# Patient Record
Sex: MALE | Race: BLACK OR AFRICAN AMERICAN | Employment: FULL TIME | URBAN - METROPOLITAN AREA
[De-identification: names, ages, dates, MRNs, and addresses within clinical notes are randomized per-mention and may not be internally consistent; named-entity substitution may affect disease eponyms.]

---

## 2018-01-25 ENCOUNTER — APPOINTMENT (OUTPATIENT)
Dept: CT IMAGING | Age: 43
End: 2018-01-25
Attending: EMERGENCY MEDICINE
Payer: MEDICAID

## 2018-01-25 ENCOUNTER — HOSPITAL ENCOUNTER (EMERGENCY)
Age: 43
Discharge: HOME | End: 2018-01-25
Attending: EMERGENCY MEDICINE
Payer: MEDICAID

## 2018-01-25 VITALS
OXYGEN SATURATION: 97 % | HEIGHT: 75 IN | HEART RATE: 66 BPM | TEMPERATURE: 98.3 F | DIASTOLIC BLOOD PRESSURE: 90 MMHG | RESPIRATION RATE: 16 BRPM | SYSTOLIC BLOOD PRESSURE: 220 MMHG | BODY MASS INDEX: 35.31 KG/M2 | WEIGHT: 283.95 LBS

## 2018-01-25 DIAGNOSIS — J01.80 OTHER ACUTE SINUSITIS, RECURRENCE NOT SPECIFIED: ICD-10-CM

## 2018-01-25 DIAGNOSIS — R22.0 NASAL SWELLING: ICD-10-CM

## 2018-01-25 DIAGNOSIS — R51.9 NONINTRACTABLE HEADACHE, UNSPECIFIED CHRONICITY PATTERN, UNSPECIFIED HEADACHE TYPE: Primary | ICD-10-CM

## 2018-01-25 DIAGNOSIS — R09.81 SINUS CONGESTION: ICD-10-CM

## 2018-01-25 LAB
ALBUMIN SERPL-MCNC: 3.7 G/DL (ref 3.5–5)
ALBUMIN/GLOB SERPL: 0.9 {RATIO} (ref 1.1–2.2)
ALP SERPL-CCNC: 63 U/L (ref 45–117)
ALT SERPL-CCNC: 27 U/L (ref 12–78)
ANION GAP SERPL CALC-SCNC: 4 MMOL/L (ref 5–15)
AST SERPL-CCNC: 15 U/L (ref 15–37)
BASOPHILS # BLD: 0 K/UL (ref 0–0.1)
BASOPHILS NFR BLD: 0 % (ref 0–1)
BILIRUB SERPL-MCNC: 0.7 MG/DL (ref 0.2–1)
BUN SERPL-MCNC: 17 MG/DL (ref 6–20)
BUN/CREAT SERPL: 12 (ref 12–20)
CALCIUM SERPL-MCNC: 9 MG/DL (ref 8.5–10.1)
CHLORIDE SERPL-SCNC: 103 MMOL/L (ref 97–108)
CO2 SERPL-SCNC: 33 MMOL/L (ref 21–32)
CREAT SERPL-MCNC: 1.39 MG/DL (ref 0.7–1.3)
EOSINOPHIL # BLD: 0.2 K/UL (ref 0–0.4)
EOSINOPHIL NFR BLD: 3 % (ref 0–7)
ERYTHROCYTE [DISTWIDTH] IN BLOOD BY AUTOMATED COUNT: 13.2 % (ref 11.5–14.5)
FLUAV AG NPH QL IA: NEGATIVE
FLUBV AG NOSE QL IA: NEGATIVE
GLOBULIN SER CALC-MCNC: 3.9 G/DL (ref 2–4)
GLUCOSE SERPL-MCNC: 110 MG/DL (ref 65–100)
HCT VFR BLD AUTO: 39.5 % (ref 36.6–50.3)
HGB BLD-MCNC: 13.4 G/DL (ref 12.1–17)
LYMPHOCYTES # BLD: 1.4 K/UL (ref 0.8–3.5)
LYMPHOCYTES NFR BLD: 18 % (ref 12–49)
MCH RBC QN AUTO: 28.5 PG (ref 26–34)
MCHC RBC AUTO-ENTMCNC: 33.9 G/DL (ref 30–36.5)
MCV RBC AUTO: 83.9 FL (ref 80–99)
MONOCYTES # BLD: 0.6 K/UL (ref 0–1)
MONOCYTES NFR BLD: 8 % (ref 5–13)
NEUTS SEG # BLD: 5.7 K/UL (ref 1.8–8)
NEUTS SEG NFR BLD: 71 % (ref 32–75)
PLATELET # BLD AUTO: 183 K/UL (ref 150–400)
POTASSIUM SERPL-SCNC: 3.7 MMOL/L (ref 3.5–5.1)
PROT SERPL-MCNC: 7.6 G/DL (ref 6.4–8.2)
RBC # BLD AUTO: 4.71 M/UL (ref 4.1–5.7)
SODIUM SERPL-SCNC: 140 MMOL/L (ref 136–145)
WBC # BLD AUTO: 7.9 K/UL (ref 4.1–11.1)

## 2018-01-25 PROCEDURE — 96374 THER/PROPH/DIAG INJ IV PUSH: CPT

## 2018-01-25 PROCEDURE — 99283 EMERGENCY DEPT VISIT LOW MDM: CPT

## 2018-01-25 PROCEDURE — 74011250637 HC RX REV CODE- 250/637: Performed by: EMERGENCY MEDICINE

## 2018-01-25 PROCEDURE — 74011250636 HC RX REV CODE- 250/636: Performed by: EMERGENCY MEDICINE

## 2018-01-25 PROCEDURE — 85025 COMPLETE CBC W/AUTO DIFF WBC: CPT | Performed by: EMERGENCY MEDICINE

## 2018-01-25 PROCEDURE — 87804 INFLUENZA ASSAY W/OPTIC: CPT | Performed by: EMERGENCY MEDICINE

## 2018-01-25 PROCEDURE — 96375 TX/PRO/DX INJ NEW DRUG ADDON: CPT

## 2018-01-25 PROCEDURE — 96361 HYDRATE IV INFUSION ADD-ON: CPT

## 2018-01-25 PROCEDURE — 80053 COMPREHEN METABOLIC PANEL: CPT | Performed by: EMERGENCY MEDICINE

## 2018-01-25 PROCEDURE — 70486 CT MAXILLOFACIAL W/O DYE: CPT

## 2018-01-25 PROCEDURE — 36415 COLL VENOUS BLD VENIPUNCTURE: CPT | Performed by: EMERGENCY MEDICINE

## 2018-01-25 RX ORDER — PROCHLORPERAZINE EDISYLATE 5 MG/ML
10 INJECTION INTRAMUSCULAR; INTRAVENOUS
Status: COMPLETED | OUTPATIENT
Start: 2018-01-25 | End: 2018-01-25

## 2018-01-25 RX ORDER — MORPHINE SULFATE 2 MG/ML
4 INJECTION, SOLUTION INTRAMUSCULAR; INTRAVENOUS
Status: DISCONTINUED | OUTPATIENT
Start: 2018-01-25 | End: 2018-01-25

## 2018-01-25 RX ORDER — TRAMADOL HYDROCHLORIDE 50 MG/1
50 TABLET ORAL
Qty: 12 TAB | Refills: 0 | Status: SHIPPED | OUTPATIENT
Start: 2018-01-25 | End: 2018-02-04

## 2018-01-25 RX ORDER — AMOXICILLIN AND CLAVULANATE POTASSIUM 875; 125 MG/1; MG/1
1 TABLET, FILM COATED ORAL 2 TIMES DAILY
Qty: 20 TAB | Refills: 0 | Status: SHIPPED | OUTPATIENT
Start: 2018-01-25

## 2018-01-25 RX ORDER — KETOROLAC TROMETHAMINE 30 MG/ML
15 INJECTION, SOLUTION INTRAMUSCULAR; INTRAVENOUS
Status: COMPLETED | OUTPATIENT
Start: 2018-01-25 | End: 2018-01-25

## 2018-01-25 RX ORDER — AMOXICILLIN AND CLAVULANATE POTASSIUM 875; 125 MG/1; MG/1
1 TABLET, FILM COATED ORAL
Status: COMPLETED | OUTPATIENT
Start: 2018-01-25 | End: 2018-01-25

## 2018-01-25 RX ORDER — BUTALBITAL, ACETAMINOPHEN AND CAFFEINE 300; 40; 50 MG/1; MG/1; MG/1
1 CAPSULE ORAL
Qty: 12 CAP | Refills: 0 | Status: SHIPPED | OUTPATIENT
Start: 2018-01-25

## 2018-01-25 RX ORDER — SODIUM CHLORIDE 9 MG/ML
1000 INJECTION, SOLUTION INTRAVENOUS CONTINUOUS
Status: DISCONTINUED | OUTPATIENT
Start: 2018-01-25 | End: 2018-01-25

## 2018-01-25 RX ADMIN — AMOXICILLIN AND CLAVULANATE POTASSIUM 1 TABLET: 875; 125 TABLET, FILM COATED ORAL at 06:23

## 2018-01-25 RX ADMIN — KETOROLAC TROMETHAMINE 15 MG: 30 INJECTION, SOLUTION INTRAMUSCULAR at 05:27

## 2018-01-25 RX ADMIN — SODIUM CHLORIDE 1000 ML: 900 INJECTION, SOLUTION INTRAVENOUS at 05:28

## 2018-01-25 RX ADMIN — PROCHLORPERAZINE EDISYLATE 10 MG: 5 INJECTION INTRAMUSCULAR; INTRAVENOUS at 05:27

## 2018-01-25 NOTE — ED TRIAGE NOTES
Chief Complaint: sinus pressure and headache   Patient states sinus pressure and swelling per patient started at 1000 yesterday now headache since 1300  Onset 1000    Pt arrived via private car.

## 2018-01-25 NOTE — DISCHARGE INSTRUCTIONS
Head or Face Pain: Care Instructions  Your Care Instructions    Common causes of head or face pain are allergies, stress, and injuries. Other causes include tooth problems and sinus infections. Eating certain foods, such as chocolate or cheese, or drinking certain liquids, such as coffee or cola, can cause head pain for some people. If you have mild head pain, you may not need treatment. It is important to watch your symptoms and talk to your doctor if your pain continues or gets worse. Follow-up care is a key part of your treatment and safety. Be sure to make and go to all appointments, and call your doctor if you are having problems. It's also a good idea to know your test results and keep a list of the medicines you take. How can you care for yourself at home? · Take pain medicines exactly as directed. ¨ If the doctor gave you a prescription medicine for pain, take it as prescribed. ¨ If you are not taking a prescription pain medicine, ask your doctor if you can take an over-the-counter pain medicine. · Take it easy for the next few days or longer if you are not feeling well. · Use a warm, moist towel or heating pad set on low to relax tight muscles in your shoulder and neck. Have someone gently massage your neck and shoulders. · Put ice or a cold pack on the area for 10 to 20 minutes at a time. Put a thin cloth between the ice and your skin. When should you call for help? Call 911 anytime you think you may need emergency care. For example, call if:  ? · You have twitching, jerking, or a seizure. ? · You passed out (lost consciousness). ? · You have symptoms of a stroke. These may include:  ¨ Sudden numbness, tingling, weakness, or loss of movement in your face, arm, or leg, especially on only one side of your body. ¨ Sudden vision changes. ¨ Sudden trouble speaking. ¨ Sudden confusion or trouble understanding simple statements. ¨ Sudden problems with walking or balance.   ¨ A sudden, severe headache that is different from past headaches. ? · You have jaw pain and pain in your chest, shoulder, neck, or arm. ?Call your doctor now or seek immediate medical care if:  ? · You have a fever with a stiff neck or a severe headache. ? · You have nausea and vomiting, or you cannot keep food or liquids down. ? Watch closely for changes in your health, and be sure to contact your doctor if:  ? · Your head or face pain does not get better as expected. Where can you learn more? Go to http://eulalia-lindsay.info/. Enter P568 in the search box to learn more about \"Head or Face Pain: Care Instructions. \"  Current as of: March 20, 2017  Content Version: 11.4  © 9784-3114 [employer]. Care instructions adapted under license by [employer] (which disclaims liability or warranty for this information). If you have questions about a medical condition or this instruction, always ask your healthcare professional. [place] [address] any warranty or liability for your use of this information. Saline Nasal Washes: Care Instructions  Your Care Instructions  Saline nasal washes help keep the nasal passages open by washing out thick or dried mucus. This simple remedy can help relieve symptoms of allergies, sinusitis, and colds. It also can make the nose feel more comfortable by keeping the mucous membranes moist. You may notice a little burning sensation in your nose the first few times you use the solution, but this usually gets better in a few days. Follow-up care is a key part of your treatment and safety. Be sure to make and go to all appointments, and call your doctor if you are having problems. It's also a good idea to know your test results and keep a list of the medicines you take. How can you care for yourself at home? · You can buy premixed saline solution in a squeeze bottle or other sinus rinse products at a drugstore.  Read and follow the instructions on the label. · You also can make your own saline solution by adding 1 teaspoon of salt and 1 teaspoon of baking soda to 2 cups of distilled water. · If you use a homemade solution, pour a small amount into a clean bowl. Using a rubber bulb syringe, squeeze the syringe and place the tip in the salt water. Pull a small amount of the salt water into the syringe by relaxing your hand. · Sit down with your head tilted slightly back. Do not lie down. Put the tip of the bulb syringe or the squeeze bottle a little way into one of your nostrils. Gently drip or squirt a few drops into the nostril. Repeat with the other nostril. Some sneezing and gagging are normal at first.  · Gently blow your nose. · Wipe the syringe or bottle tip clean after each use. · Repeat this 2 or 3 times a day. · Use nasal washes gently if you have nosebleeds often. When should you call for help? Watch closely for changes in your health, and be sure to contact your doctor if:  ? · You often get nosebleeds. ? · You have problems doing the nasal washes. Where can you learn more? Go to http://eulalia-lindsay.info/. Enter 638 [id number] [id number] in the search box to learn more about \"Saline Nasal Washes: Care Instructions. \"  Current as of: May 12, 2017  Content Version: 11.4  © 0028-8754 [employer]. Care instructions adapted under license by [employer] (which disclaims liability or warranty for this information). If you have questions about a medical condition or this instruction, always ask your healthcare professional. [address] any warranty or liability for your use of this information. Headache: Care Instructions  Your Care Instructions    Headaches have many possible causes. Most headaches aren't a sign of a more serious problem, and they will get better on their own. Home treatment may help you feel better faster.   The doctor has checked you carefully, but problems can develop later. If you notice any problems or new symptoms, get medical treatment right away. Follow-up care is a key part of your treatment and safety. Be sure to make and go to all appointments, and call your doctor if you are having problems. It's also a good idea to know your test results and keep a list of the medicines you take. How can you care for yourself at home? · Do not drive if you have taken a prescription pain medicine. · Rest in a quiet, dark room until your headache is gone. Close your eyes and try to relax or go to sleep. Don't watch TV or read. · Put a cold, moist cloth or cold pack on the painful area for 10 to 20 minutes at a time. Put a thin cloth between the cold pack and your skin. · Use a warm, moist towel or a heating pad set on low to relax tight shoulder and neck muscles. · Have someone gently massage your neck and shoulders. · Take pain medicines exactly as directed. ¨ If the doctor gave you a prescription medicine for pain, take it as prescribed. ¨ If you are not taking a prescription pain medicine, ask your doctor if you can take an over-the-counter medicine. · Be careful not to take pain medicine more often than the instructions allow, because you may get worse or more frequent headaches when the medicine wears off. · Do not ignore new symptoms that occur with a headache, such as a fever, weakness or numbness, vision changes, or confusion. These may be signs of a more serious problem. To prevent headaches  · Keep a headache diary so you can figure out what triggers your headaches. Avoiding triggers may help you prevent headaches. Record when each headache began, how long it lasted, and what the pain was like (throbbing, aching, stabbing, or dull). Write down any other symptoms you had with the headache, such as nausea, flashing lights or dark spots, or sensitivity to bright light or loud noise. Note if the headache occurred near your period.  List anything that might have triggered the headache, such as certain foods (chocolate, cheese, wine) or odors, smoke, bright light, stress, or lack of sleep. · Find healthy ways to deal with stress. Headaches are most common during or right after stressful times. Take time to relax before and after you do something that has caused a headache in the past.  · Try to keep your muscles relaxed by keeping good posture. Check your jaw, face, neck, and shoulder muscles for tension, and try relaxing them. When sitting at a desk, change positions often, and stretch for 30 seconds each hour. · Get plenty of sleep and exercise. · Eat regularly and well. Long periods without food can trigger a headache. · Treat yourself to a massage. Some people find that regular massages are very helpful in relieving tension. · Limit caffeine by not drinking too much coffee, tea, or soda. But don't quit caffeine suddenly, because that can also give you headaches. · Reduce eyestrain from computers by blinking frequently and looking away from the computer screen every so often. Make sure you have proper eyewear and that your monitor is set up properly, about an arm's length away. · Seek help if you have depression or anxiety. Your headaches may be linked to these conditions. Treatment can both prevent headaches and help with symptoms of anxiety or depression. When should you call for help? Call 911 anytime you think you may need emergency care. For example, call if:  ? · You have signs of a stroke. These may include:  ¨ Sudden numbness, paralysis, or weakness in your face, arm, or leg, especially on only one side of your body. ¨ Sudden vision changes. ¨ Sudden trouble speaking. ¨ Sudden confusion or trouble understanding simple statements. ¨ Sudden problems with walking or balance. ¨ A sudden, severe headache that is different from past headaches. ?Call your doctor now or seek immediate medical care if:  ? · You have a new or worse headache.    ? · Your headache gets much worse. Where can you learn more? Go to http://eulalia-lindsay.info/. Enter M271 in the search box to learn more about \"Headache: Care Instructions. \"  Current as of: October 14, 2016  Content Version: 11.4  © 3335-1470 [employer]. Care instructions adapted under license by [employer] (which disclaims liability or warranty for this information). If you have questions about a medical condition or this instruction, always ask your healthcare professional. [place] [address] any warranty or liability for your use of this information.

## 2018-01-25 NOTE — ED PROVIDER NOTES
EMERGENCY DEPARTMENT HISTORY AND PHYSICAL EXAM      Date: 1/25/2018  Patient Name: Yumiko Tracey    History of Presenting Illness     Chief Complaint   Patient presents with    Headache     headache since 1300 yesterday generalized and now right sided headache    Other     sinus pressure since 1000 yesterday morning. History Provided By: Patient    HPI: Yumiko Tracey, 43 y.o. male with PMHx significant for HTN, presents to the ED with cc of HA since yesterday at 1 PM. Pt reports swelling in the right side of nose that he noticed yesterday at 10 AM. He also reports chills yesterday, sinus pressure, and brief epistaxis after blowing his nose. Pt states the HA woke him up from sleeping this evening. Pt reports treating HA with Excedrin with mild to no relief. He states his last dose was at 1 AM.  Pt denies sick contact at home. He denies receiving flu shot this year. He denies dental pain. Pt denies PMhx of sinusitis. PCP: Darryle Record, MD    There are no other complaints, changes, or physical findings at this time. Current Outpatient Prescriptions   Medication Sig Dispense Refill    amoxicillin-clavulanate (AUGMENTIN) 875-125 mg per tablet Take 1 Tab by mouth two (2) times a day. 20 Tab 0    butalbital-acetaminophen-caff (FIORICET) -40 mg per capsule Take 1 Cap by mouth every four (4) hours as needed for Pain or Headache. 12 Cap 0    cloNIDine (CATAPRES) 0.2 mg tablet Take 1 Tab by mouth two (2) times a day. 180 Tab 0    carvedilol (COREG) 25 mg tablet Take 1 Tab by mouth two (2) times daily (with meals).  180 Tab 0       Past History     Past Medical History:  Past Medical History:   Diagnosis Date    Hypertension     Septicemia ([address]        Past Surgical History:  Past Surgical History:   Procedure Laterality Date    HX ORTHOPAEDIC      R leg fracture, L 5th finger fracture    LEG/ANKLE SURGERY PROC UNLISTED         Family History:  Family History   Problem Relation Age of Onset  Diabetes Mother     Hypertension Mother     Diabetes Father     Hypertension Father     Cancer Neg Hx        Social History:  Social History   Substance Use Topics    Smoking status: Never Smoker    Smokeless tobacco: Former User    Alcohol use No       Allergies: Allergies   Allergen Reactions    Lisinopril Other (comments) and Angioedema     Lost appetite/lip swelling      Morphine Nausea and Vomiting    Viagra [Sildenafil] Diarrhea         Review of Systems   Review of Systems   Constitutional: Positive for chills. Negative for fever. HENT: Positive for facial swelling (right side of nose), nosebleeds (1 episode, very brief) and sinus pressure. Negative for congestion, dental problem, rhinorrhea, sneezing and sore throat. Eyes: Negative. Negative for redness and visual disturbance. Respiratory: Negative. Negative for cough, shortness of breath and wheezing. Cardiovascular: Negative. Negative for chest pain and leg swelling. Gastrointestinal: Negative. Negative for abdominal pain, diarrhea, nausea and vomiting. Genitourinary: Negative. Negative for difficulty urinating, discharge and frequency. Musculoskeletal: Negative. Negative for arthralgias, back pain, myalgias and neck stiffness. Skin: Negative. Negative for color change and rash. Neurological: Positive for headaches. Negative for dizziness, syncope, weakness and numbness. Hematological: Negative for adenopathy. Psychiatric/Behavioral: Negative. All other systems reviewed and are negative. Physical Exam   Physical Exam   Constitutional: He is oriented to person, place, and time. Tired appearing. Afebrile. Non toxic. HENT:   Head: Atraumatic. Right lateral nasal bridge swelling that was tender. Frontal and maxillary sinus tenderness. Boggy nasal turbinates bilaterally with small area of blood in right nares. No visible abscess, polyp, or drainage.     Eyes: EOM are normal.   Cardiovascular: Normal rate, regular rhythm, normal heart sounds and intact distal pulses. Exam reveals no gallop and no friction rub. No murmur heard. Hypertensive initially. Pulmonary/Chest: Effort normal and breath sounds normal. No respiratory distress. He has no wheezes. He has no rales. He exhibits no tenderness. Abdominal: Soft. Bowel sounds are normal. He exhibits no distension and no mass. There is no tenderness. There is no rebound and no guarding. Musculoskeletal: Normal range of motion. He exhibits no edema or tenderness. Neurological: He is alert and oriented to person, place, and time. No cranial nerve deficit. EOM intact, pupils direct and consensual, reflexes intact, CN II-XII grossly intact, strength equal and symmetric, alert and oriented     Psychiatric: He has a normal mood and affect. Nursing note and vitals reviewed. Diagnostic Study Results     Labs -     Recent Results (from the past 12 hour(s))   CBC WITH AUTOMATED DIFF    Collection Time: 01/25/18  5:07 AM   Result Value Ref Range    WBC 7.9 4.1 - 11.1 K/uL    RBC 4.71 4.10 - 5.70 M/uL    HGB 13.4 12.1 - 17.0 g/dL    HCT 39.5 36.6 - 50.3 %    MCV 83.9 80.0 - 99.0 FL    MCH 28.5 26.0 - 34.0 PG    MCHC 33.9 30.0 - 36.5 g/dL    RDW 13.2 11.5 - 14.5 %    PLATELET 795 249 - 133 K/uL    NEUTROPHILS 71 32 - 75 %    LYMPHOCYTES 18 12 - 49 %    MONOCYTES 8 5 - 13 %    EOSINOPHILS 3 0 - 7 %    BASOPHILS 0 0 - 1 %    ABS. NEUTROPHILS 5.7 1.8 - 8.0 K/UL    ABS. LYMPHOCYTES 1.4 0.8 - 3.5 K/UL    ABS. MONOCYTES 0.6 0.0 - 1.0 K/UL    ABS. EOSINOPHILS 0.2 0.0 - 0.4 K/UL    ABS.  BASOPHILS 0.0 0.0 - 0.1 K/UL   METABOLIC PANEL, COMPREHENSIVE    Collection Time: 01/25/18  5:07 AM   Result Value Ref Range    Sodium 140 136 - 145 mmol/L    Potassium 3.7 3.5 - 5.1 mmol/L    Chloride 103 97 - 108 mmol/L    CO2 33 (H) 21 - 32 mmol/L    Anion gap 4 (L) 5 - 15 mmol/L    Glucose 110 (H) 65 - 100 mg/dL    BUN 17 6 - 20 MG/DL    Creatinine 1.39 (H) 0.70 - 1.30 MG/DL BUN/Creatinine ratio 12 12 - 20      GFR est AA >60 >60 ml/min/1.73m2    GFR est non-AA 56 (L) >60 ml/min/1.73m2    Calcium 9.0 8.5 - 10.1 MG/DL    Bilirubin, total 0.7 0.2 - 1.0 MG/DL    ALT (SGPT) 27 12 - 78 U/L    AST (SGOT) 15 15 - 37 U/L    Alk. phosphatase 63 45 - 117 U/L    Protein, total 7.6 6.4 - 8.2 g/dL    Albumin 3.7 3.5 - 5.0 g/dL    Globulin 3.9 2.0 - 4.0 g/dL    A-G Ratio 0.9 (L) 1.1 - 2.2     INFLUENZA A & B AG (RAPID TEST)    Collection Time: 01/25/18  5:07 AM   Result Value Ref Range    Influenza A Antigen NEGATIVE  NEG      Influenza B Antigen NEGATIVE  NEG         Radiologic Studies -   CT Results  (Last 48 hours)               01/25/18 0545  CT MAXILLOFACIAL WO CONT Final result    Impression:  IMPRESSION: Soft tissue edema in the right nose and nasolabial fold region. Paranasal sinus disease as above. Narrative:  EXAM:  CT MAXILLOFACIAL WO CONT       INDICATION:   Mass, lump or swelling, maxface       COMPARISON:  None. CONTRAST:   None. TECHNIQUE:  Multislice helical CT of the facial bones was performed in the axial   plane without intravenous contrast administration. Coronal and sagittal   reformations were generated. CT dose reduction was achieved through use of a   standardized protocol tailored for this examination and automatic exposure   control for dose modulation. FINDINGS:       There is no facial fracture or other osseous abnormality. There is prominent   soft tissue swelling over the right side of the nose and nasolabial fold region   with no collection or mass. The visualized paranasal sinuses and mastoid air cells show mucoperiosteal   thickening and mucus retention cysts or polyps in the maxillary sinuses and   ethmoid mucoperiosteal thickening but otherwise are clear.        The globes, optic nerves and extraocular muscles are normal.       No abnormalities are identified within the visualized portions of the brain or nasopharynx. Medical Decision Making   I am the first provider for this patient. I reviewed the vital signs, available nursing notes, past medical history, past surgical history, family history and social history. Vital Signs-Reviewed the patient's vital signs. Patient Vitals for the past 12 hrs:   Temp Pulse Resp BP SpO2   01/25/18 0423 98.3 °F (36.8 °C) 66 16 (!) 220/90 97 %       Records Reviewed: Old Medical Records    Provider Notes (Medical Decision Making):   DDx: nasal abscess, sinusitis, nasal polyp, influenza, viral illness, migraine, tension HA    ED Course:   Initial assessment performed. The patients presenting problems have been discussed, and they are in agreement with the care plan formulated and outlined with them. I have encouraged them to ask questions as they arise throughout their visit.    6:06 AM  Reviewed imaging. HA is improved. Discussed likely treatment of sinusitis      Disposition:  Discharge Note:  7:24 AM  The pt is ready for discharge. The pt's signs, symptoms, diagnosis, and discharge instructions have been discussed and pt has conveyed their understanding. The pt is to follow up as recommended or return to ER should their symptoms worsen. Plan has been discussed and pt is in agreement. PLAN:  1. Current Discharge Medication List      START taking these medications    Details   amoxicillin-clavulanate (AUGMENTIN) 875-125 mg per tablet Take 1 Tab by mouth two (2) times a day. Qty: 20 Tab, Refills: 0      butalbital-acetaminophen-caff (FIORICET) -40 mg per capsule Take 1 Cap by mouth every four (4) hours as needed for Pain or Headache. Qty: 12 Cap, Refills: 0           2.    Follow-up Information     Follow up With Details Comments Contact Info    Padmini Childress MD In 2 days  [facility] [address]  [facility]  [facility] [address] [phone]      Jim Turpin MD In 1 week As needed, If symptoms worsen [address] [facility] [address] [facility], [address]  [phone]      [facility] EMERGENCY DEPT  If symptoms worsen [address] [address]  [phone]        Return to ED if worse     Diagnosis     Clinical Impression:   1. Nonintractable headache, unspecified chronicity pattern, unspecified headache type    2. Sinus congestion    3. Other acute sinusitis, recurrence not specified    4. Nasal swelling        Attestations: This note is prepared by Kb Flynn, acting as a Scribe for MD Adela Hills MD: The scribe's documentation has been prepared under my direction and personally reviewed by me in its entirety. I confirm that the notes above accurately reflects all work, treatment, procedures, and medical decision making performed by me.

## 2018-01-25 NOTE — LETTER
[address] 
[facility] EMERGENCY DEPT 
[address] [place] [address] [address] [id number] [phone] Work/School Note Date: 1/25/2018 To Whom It May concern: 
 
Doni Huang was seen and treated today in the emergency room by the following provider(s): 
Attending Provider: Quinn Daniels MD.   
 
Doni Huang will be out of work thru Friday, January 28th. Sincerely, Quinn Daniels MD

## 2021-07-21 ENCOUNTER — APPOINTMENT (OUTPATIENT)
Dept: GENERAL RADIOLOGY | Age: 46
End: 2021-07-21
Attending: EMERGENCY MEDICINE
Payer: OTHER MISCELLANEOUS

## 2021-07-21 ENCOUNTER — HOSPITAL ENCOUNTER (EMERGENCY)
Age: 46
Discharge: HOME | End: 2021-07-21
Attending: EMERGENCY MEDICINE
Payer: OTHER MISCELLANEOUS

## 2021-07-21 VITALS
WEIGHT: 297.4 LBS | HEIGHT: 74 IN | BODY MASS INDEX: 38.17 KG/M2 | TEMPERATURE: 98.6 F | RESPIRATION RATE: 16 BRPM | SYSTOLIC BLOOD PRESSURE: 175 MMHG | DIASTOLIC BLOOD PRESSURE: 89 MMHG | OXYGEN SATURATION: 100 % | HEART RATE: 64 BPM

## 2021-07-21 DIAGNOSIS — S63.501A SPRAIN OF RIGHT WRIST, INITIAL ENCOUNTER: Primary | ICD-10-CM

## 2021-07-21 PROCEDURE — 73110 X-RAY EXAM OF WRIST: CPT

## 2021-07-21 PROCEDURE — 99281 EMR DPT VST MAYX REQ PHY/QHP: CPT

## 2021-07-21 RX ORDER — DICLOFENAC SODIUM 75 MG/1
75 TABLET, DELAYED RELEASE ORAL 2 TIMES DAILY
Qty: 20 TABLET | Refills: 0 | Status: SHIPPED | OUTPATIENT
Start: 2021-07-21

## 2021-07-21 NOTE — LETTER
[address]  [facility] EMERGENCY DEPT  [address] [place] [address]  Joyce Aguilar [id number] [phone]    Work/School Note    Date: 7/21/2021    To Whom It May concern:    Virginia Mahan was seen and treated today in the emergency room by the following provider(s):  Attending Provider: Joslyn Snyder MD  Physician Assistant: SHANTAL Meneses. Please excuse him from work on July 22 and 23.     Sincerely,          SHANTAL Simons

## 2021-07-22 NOTE — ED PROVIDER NOTES
EMERGENCY DEPARTMENT HISTORY AND PHYSICAL EXAM      Date: 7/21/2021  Patient Name: El Moulton    History of Presenting Illness     Chief Complaint   Patient presents with    Wrist Pain     Patient was stepping off truck and missed step, caught self on R wrist and felt a pop. Endorses pain in wrist and hx of breaking same wrist previously. History Provided By: Patient    HPI: El Moulton, 55 y.o. male with PMHx significant for hypertension, presents to the ED with cc of right wrist injury this evening. The patient was stepping off of his truck and fell approximately 3 feet, landing on his outstretched right hand. He felt a pop in his right ulnar wrist.  Since then, he has had severe pain to the ulnar wrist.  Pain is worse with movement. He took naproxen with some relief. He denies numbness, other injuries. There are no other complaints, changes, or physical findings at this time. PCP: Trey Santos MD    No current facility-administered medications on file prior to encounter. Current Outpatient Medications on File Prior to Encounter   Medication Sig Dispense Refill    amoxicillin-clavulanate (AUGMENTIN) 875-125 mg per tablet Take 1 Tab by mouth two (2) times a day. 20 Tab 0    butalbital-acetaminophen-caff (FIORICET) -40 mg per capsule Take 1 Cap by mouth every four (4) hours as needed for Pain or Headache. 12 Cap 0    cloNIDine (CATAPRES) 0.2 mg tablet Take 1 Tab by mouth two (2) times a day. 180 Tab 0    carvedilol (COREG) 25 mg tablet Take 1 Tab by mouth two (2) times daily (with meals).  180 Tab 0       Past History     Past Medical History:  Past Medical History:   Diagnosis Date    Hypertension     Septicemia ([address]        Past Surgical History:  Past Surgical History:   Procedure Laterality Date    HX ORTHOPAEDIC      R leg fracture, L 5th finger fracture    LEG/ANKLE SURGERY PROC UNLISTED         Family History:  Family History   Problem Relation Age of Onset    Diabetes Mother     Hypertension Mother     Diabetes Father     Hypertension Father     Cancer Neg Hx        Social History:  Social History     Tobacco Use    Smoking status: Never Smoker    Smokeless tobacco: Former User   Substance Use Topics    Alcohol use: No    Drug use: Yes     Types: Marijuana       Allergies: Allergies   Allergen Reactions    Lisinopril Other (comments) and Angioedema     Lost appetite/lip swelling      Morphine Nausea and Vomiting    Viagra [Sildenafil] Diarrhea         Review of Systems   Review of Systems   Constitutional: Negative for chills and fever. HENT: Negative for ear pain and sore throat. Eyes: Negative for redness and visual disturbance. Respiratory: Negative for cough and shortness of breath. Cardiovascular: Negative for chest pain and palpitations. Gastrointestinal: Negative for abdominal pain, nausea and vomiting. Genitourinary: Negative for dysuria and hematuria. Musculoskeletal: Negative for back pain and gait problem. Positive for right wrist pain   Skin: Negative for rash and wound. Neurological: Negative for dizziness and headaches. Psychiatric/Behavioral: Negative for behavioral problems and confusion. All other systems reviewed and are negative. Physical Exam   Physical Exam  Constitutional:       Appearance: He is not toxic-appearing. HENT:      Head: Normocephalic and atraumatic. Mouth/Throat:      Mouth: Mucous membranes are moist.   Eyes:      Extraocular Movements: Extraocular movements intact. Pupils: Pupils are equal, round, and reactive to light. Cardiovascular:      Rate and Rhythm: Normal rate and regular rhythm. Pulmonary:      Effort: Pulmonary effort is normal. No respiratory distress. Musculoskeletal:      Cervical back: Normal range of motion and neck supple. Comments: Tenderness to palpation over the right distal ulna.   Full range of motion in the wrist, although movement elicits pain.  No obvious deformity or swelling. 2+ radial pulse. Distal sensation and cap refill intact. Skin:     General: Skin is warm and dry. Neurological:      General: No focal deficit present. Mental Status: He is alert and oriented to person, place, and time. Psychiatric:         Behavior: Behavior normal.           Diagnostic Study Results     Labs -   No results found for this or any previous visit (from the past 12 hour(s)). Radiologic Studies -   XR WRIST RT AP/LAT/OBL MIN 3V   Final Result   No acute abnormality. CT Results  (Last 48 hours)    None        CXR Results  (Last 48 hours)    None            Medical Decision Making   I am the first provider for this patient. I reviewed the vital signs, available nursing notes, past medical history, past surgical history, family history and social history. Vital Signs-Reviewed the patient's vital signs. Patient Vitals for the past 12 hrs:   Temp Pulse Resp BP SpO2   07/21/21 1948 98.6 °F (37 °C) 64 16 (!) 175/89 100 %         Records Reviewed: Nursing Notes and Old Medical Records      Provider Notes (Medical Decision Making):   DDx: Sprain, fracture, contusion, dislocation    X-ray shows no acute abnormality. Wrist was immobilized with wrist splint. Discussed RICE. Will give Ortho referral for a recheck. Discussed return precautions. ED Course:   Initial assessment performed. The patients presenting problems have been discussed, and they are in agreement with the care plan formulated and outlined with them. I have encouraged them to ask questions as they arise throughout their visit. Disposition:  10:03 PM  The patient has been re-evaluated and is ready for discharge. Reviewed available results with patient. Counseled patient on diagnosis and care plan. Patient has expressed understanding, and all questions have been answered.  Patient agrees with plan and agrees to follow up as recommended, or to return to the ED if their symptoms worsen. Discharge instructions have been provided and explained to the patient, along with reasons to return to the ED. PLAN:  1. Discharge Medication List as of 7/21/2021 10:03 PM      START taking these medications    Details   diclofenac EC (VOLTAREN) 75 mg EC tablet Take 1 Tablet by mouth two (2) times a day., Normal, Disp-20 Tablet, R-0         CONTINUE these medications which have NOT CHANGED    Details   amoxicillin-clavulanate (AUGMENTIN) 875-125 mg per tablet Take 1 Tab by mouth two (2) times a day., Normal, Disp-20 Tab, R-0      butalbital-acetaminophen-caff (FIORICET) -40 mg per capsule Take 1 Cap by mouth every four (4) hours as needed for Pain or Headache., Normal, Disp-12 Cap, R-0      cloNIDine (CATAPRES) 0.2 mg tablet Take 1 Tab by mouth two (2) times a day., Normal, Disp-180 Tab, R-0      carvedilol (COREG) 25 mg tablet Take 1 Tab by mouth two (2) times daily (with meals). , Normal, Disp-180 Tab, R-0           2. Follow-up Information     Follow up With Specialties Details Why Billy Valdez MD Hand Surgery Call  to schedule a follow up appointment [address]  [address] [address]  [address] [facility]  [phone]          Return to ED if worse     Diagnosis     Clinical Impression:   1. Sprain of right wrist, initial encounter            Buddy Sutherland.  SHRUTI Man

## 2021-07-22 NOTE — DISCHARGE INSTRUCTIONS
It was a pleasure taking care of you in our Emergency Department today. We know that when you come to [facility], you are entrusting us with your health, comfort, and safety. Our physicians and nurses honor that trust, and truly appreciate the opportunity to care for you and your loved ones. We also value your feedback. If you receive a survey about your Emergency Department experience today, please fill it out. We care about our patients' feedback, and we listen to what you have to say. Thank you!

## 2021-09-10 ENCOUNTER — TRANSCRIBE ORDER (OUTPATIENT)
Dept: SCHEDULING | Age: 46
End: 2021-09-10

## 2021-09-10 DIAGNOSIS — I10 HYPERTENSION: Primary | ICD-10-CM

## 2021-09-10 DIAGNOSIS — I10 ESSENTIAL HYPERTENSION, MALIGNANT: ICD-10-CM

## 2021-09-10 DIAGNOSIS — N17.9 ACUTE KIDNEY FAILURE, UNSPECIFIED (HCC): Primary | ICD-10-CM

## 2021-09-17 ENCOUNTER — HOSPITAL ENCOUNTER (OUTPATIENT)
Dept: ULTRASOUND IMAGING | Age: 46
Discharge: HOME | End: 2021-09-17
Attending: INTERNAL MEDICINE

## 2021-09-17 DIAGNOSIS — N17.9 ACUTE KIDNEY FAILURE, UNSPECIFIED (HCC): ICD-10-CM

## 2021-09-17 DIAGNOSIS — I10 ESSENTIAL HYPERTENSION, MALIGNANT: ICD-10-CM

## 2021-09-17 PROCEDURE — 76770 US EXAM ABDO BACK WALL COMP: CPT

## 2021-09-30 ENCOUNTER — TRANSCRIBE ORDER (OUTPATIENT)
Dept: SCHEDULING | Age: 46
End: 2021-09-30

## 2021-09-30 ENCOUNTER — HOSPITAL ENCOUNTER (OUTPATIENT)
Dept: VASCULAR SURGERY | Age: 46
Discharge: HOME | End: 2021-09-17
Attending: INTERNAL MEDICINE

## 2021-09-30 DIAGNOSIS — I70.0 ATHEROSCLEROSIS OF AORTA (HCC): Primary | ICD-10-CM

## 2021-09-30 DIAGNOSIS — I10 ESSENTIAL HYPERTENSION, MALIGNANT: ICD-10-CM

## 2021-10-11 ENCOUNTER — HOSPITAL ENCOUNTER (OUTPATIENT)
Dept: VASCULAR SURGERY | Age: 46
Discharge: HOME | End: 2021-10-11
Attending: INTERNAL MEDICINE

## 2021-10-11 VITALS — SYSTOLIC BLOOD PRESSURE: 204 MMHG | DIASTOLIC BLOOD PRESSURE: 123 MMHG

## 2021-10-11 DIAGNOSIS — I70.0 ATHEROSCLEROSIS OF AORTA (HCC): ICD-10-CM

## 2021-10-11 LAB
ABDOMINAL PROX AORTA VEL: 80.6 CM/S
CELIAC EDV: 73.1 CM/S
CELIAC PSV: 257.4 CM/S
DIST AORTIC AP: 1.81 CM
LEFT KIDNEY LENGTH: 12.87 CM
LEFT KIDNEY WIDTH: 5.22 CM
LEFT RENAL DIST DIAS: 30.3 CM/S
LEFT RENAL DIST RAR: 1.35
LEFT RENAL DIST RI: 0.72
LEFT RENAL DIST SYS: 108.9 CM/S
LEFT RENAL LOWER PARENCHYMA MAX: 19.7 CM/S
LEFT RENAL LOWER PARENCHYMA MIN: 9.7 CM/S
LEFT RENAL LOWER PARENCHYMA RI: 0.51
LEFT RENAL MID DIAS: 30.3 CM/S
LEFT RENAL MID RAR: 0.99
LEFT RENAL MID RI: 0.62
LEFT RENAL MID SYS: 79.6 CM/S
LEFT RENAL MIDDLE PARENCHYMA MAX: 30.7 CM/S
LEFT RENAL MIDDLE PARENCHYMA MIN: 11.5 CM/S
LEFT RENAL MIDDLE PARENCHYMA RI: 0.63
LEFT RENAL PROX DIAS: 31.4 CM/S
LEFT RENAL PROX RAR: 1.12
LEFT RENAL PROX RI: 0.65
LEFT RENAL PROX SYS: 90.4 CM/S
LEFT RENAL UPPER PARENCHYMA MAX: 25.2 CM/S
LEFT RENAL UPPER PARENCHYMA MIN: 12.4 CM/S
LEFT RENAL UPPER PARENCHYMA RI: 0.51
PROX AORTIC AP: 2.98 CM
PROX SMA EDV: 18.1 CM/S
PROX SMA PSV: 137.7 CM/S
RIGHT KIDNEY LENGTH: 11.32 CM
RIGHT KIDNEY WIDTH: 6.16 CM
RIGHT RENAL DIST DIAS: 75.3 CM/S
RIGHT RENAL DIST RAR: 2.11
RIGHT RENAL DIST RI: 0.56
RIGHT RENAL DIST SYS: 169.7 CM/S
RIGHT RENAL LOWER PARENCHYMA MAX: 36.2 CM/S
RIGHT RENAL LOWER PARENCHYMA MIN: 16.1 CM/S
RIGHT RENAL LOWER PARENCHYMA RI: 0.56
RIGHT RENAL MID DIAS: 44.6 CM/S
RIGHT RENAL MID RAR: 1.59
RIGHT RENAL MID RI: 0.65
RIGHT RENAL MID SYS: 128 CM/S
RIGHT RENAL MIDDLE PARENCHYMA MAX: 28.9 CM/S
RIGHT RENAL MIDDLE PARENCHYMA MIN: 15.2 CM/S
RIGHT RENAL MIDDLE PARENCHYMA RI: 0.47
RIGHT RENAL PROX DIAS: 56 CM/S
RIGHT RENAL PROX RAR: 2.04
RIGHT RENAL PROX RI: 0.66
RIGHT RENAL PROX SYS: 164.8 CM/S
RIGHT RENAL UPPER PARENCHYMA MAX: 41.6 CM/S
RIGHT RENAL UPPER PARENCHYMA MIN: 20.6 CM/S
RIGHT RENAL UPPER PARENCHYMA RI: 0.5

## 2021-10-11 PROCEDURE — 93975 VASCULAR STUDY: CPT

## 2022-12-02 ENCOUNTER — HOSPITAL ENCOUNTER (EMERGENCY)
Age: 47
Discharge: HOME | End: 2022-12-02
Attending: STUDENT IN AN ORGANIZED HEALTH CARE EDUCATION/TRAINING PROGRAM
Payer: COMMERCIAL

## 2022-12-02 ENCOUNTER — APPOINTMENT (OUTPATIENT)
Dept: GENERAL RADIOLOGY | Age: 47
End: 2022-12-02
Attending: STUDENT IN AN ORGANIZED HEALTH CARE EDUCATION/TRAINING PROGRAM
Payer: COMMERCIAL

## 2022-12-02 ENCOUNTER — APPOINTMENT (OUTPATIENT)
Dept: CT IMAGING | Age: 47
End: 2022-12-02
Attending: STUDENT IN AN ORGANIZED HEALTH CARE EDUCATION/TRAINING PROGRAM
Payer: COMMERCIAL

## 2022-12-02 VITALS
WEIGHT: 315 LBS | TEMPERATURE: 98.5 F | HEART RATE: 69 BPM | HEIGHT: 74 IN | DIASTOLIC BLOOD PRESSURE: 88 MMHG | OXYGEN SATURATION: 96 % | SYSTOLIC BLOOD PRESSURE: 173 MMHG | RESPIRATION RATE: 21 BRPM | BODY MASS INDEX: 40.43 KG/M2

## 2022-12-02 DIAGNOSIS — R51.9 ACUTE NONINTRACTABLE HEADACHE, UNSPECIFIED HEADACHE TYPE: Primary | ICD-10-CM

## 2022-12-02 LAB
ALBUMIN SERPL-MCNC: 3.6 G/DL (ref 3.5–5)
ALBUMIN/GLOB SERPL: 0.9 {RATIO} (ref 1.1–2.2)
ALP SERPL-CCNC: 54 U/L (ref 45–117)
ALT SERPL-CCNC: 27 U/L (ref 12–78)
ANION GAP SERPL CALC-SCNC: 4 MMOL/L (ref 5–15)
AST SERPL-CCNC: 18 U/L (ref 15–37)
BASOPHILS # BLD: 0 K/UL (ref 0–0.1)
BASOPHILS NFR BLD: 0 % (ref 0–1)
BILIRUB SERPL-MCNC: 1.1 MG/DL (ref 0.2–1)
BNP SERPL-MCNC: 104 PG/ML
BUN SERPL-MCNC: 17 MG/DL (ref 6–20)
BUN/CREAT SERPL: 11 (ref 12–20)
CALCIUM SERPL-MCNC: 8.7 MG/DL (ref 8.5–10.1)
CHLORIDE SERPL-SCNC: 102 MMOL/L (ref 97–108)
CO2 SERPL-SCNC: 33 MMOL/L (ref 21–32)
CREAT SERPL-MCNC: 1.51 MG/DL (ref 0.7–1.3)
DIFFERENTIAL METHOD BLD: NORMAL
EOSINOPHIL # BLD: 0.2 K/UL (ref 0–0.4)
EOSINOPHIL NFR BLD: 5 % (ref 0–7)
ERYTHROCYTE [DISTWIDTH] IN BLOOD BY AUTOMATED COUNT: 13.3 % (ref 11.5–14.5)
FLUAV AG NPH QL IA: NEGATIVE
FLUBV AG NOSE QL IA: NEGATIVE
GLOBULIN SER CALC-MCNC: 3.8 G/DL (ref 2–4)
GLUCOSE SERPL-MCNC: 119 MG/DL (ref 65–100)
HCT VFR BLD AUTO: 36.7 % (ref 36.6–50.3)
HGB BLD-MCNC: 12.4 G/DL (ref 12.1–17)
IMM GRANULOCYTES # BLD AUTO: 0 K/UL (ref 0–0.04)
IMM GRANULOCYTES NFR BLD AUTO: 0 % (ref 0–0.5)
LYMPHOCYTES # BLD: 2.4 K/UL (ref 0.8–3.5)
LYMPHOCYTES NFR BLD: 44 % (ref 12–49)
MAGNESIUM SERPL-MCNC: 2.1 MG/DL (ref 1.6–2.4)
MCH RBC QN AUTO: 28.9 PG (ref 26–34)
MCHC RBC AUTO-ENTMCNC: 33.8 G/DL (ref 30–36.5)
MCV RBC AUTO: 85.5 FL (ref 80–99)
MONOCYTES # BLD: 0.6 K/UL (ref 0–1)
MONOCYTES NFR BLD: 11 % (ref 5–13)
NEUTS SEG # BLD: 2.2 K/UL (ref 1.8–8)
NEUTS SEG NFR BLD: 40 % (ref 32–75)
NRBC # BLD: 0 K/UL (ref 0–0.01)
NRBC BLD-RTO: 0 PER 100 WBC
PLATELET # BLD AUTO: 220 K/UL (ref 150–400)
PMV BLD AUTO: 10.4 FL (ref 8.9–12.9)
POTASSIUM SERPL-SCNC: 2.7 MMOL/L (ref 3.5–5.1)
PROT SERPL-MCNC: 7.4 G/DL (ref 6.4–8.2)
RBC # BLD AUTO: 4.29 M/UL (ref 4.1–5.7)
SODIUM SERPL-SCNC: 139 MMOL/L (ref 136–145)
TROPONIN-HIGH SENSITIVITY: 48 NG/L (ref 0–76)
TROPONIN-HIGH SENSITIVITY: 48 NG/L (ref 0–76)
WBC # BLD AUTO: 5.4 K/UL (ref 4.1–11.1)

## 2022-12-02 PROCEDURE — 36415 COLL VENOUS BLD VENIPUNCTURE: CPT

## 2022-12-02 PROCEDURE — 83735 ASSAY OF MAGNESIUM: CPT

## 2022-12-02 PROCEDURE — 70496 CT ANGIOGRAPHY HEAD: CPT

## 2022-12-02 PROCEDURE — 84484 ASSAY OF TROPONIN QUANT: CPT

## 2022-12-02 PROCEDURE — 71045 X-RAY EXAM CHEST 1 VIEW: CPT

## 2022-12-02 PROCEDURE — 70450 CT HEAD/BRAIN W/O DYE: CPT

## 2022-12-02 PROCEDURE — 85025 COMPLETE CBC W/AUTO DIFF WBC: CPT

## 2022-12-02 PROCEDURE — 74011250637 HC RX REV CODE- 250/637: Performed by: STUDENT IN AN ORGANIZED HEALTH CARE EDUCATION/TRAINING PROGRAM

## 2022-12-02 PROCEDURE — 93005 ELECTROCARDIOGRAM TRACING: CPT

## 2022-12-02 PROCEDURE — 96365 THER/PROPH/DIAG IV INF INIT: CPT

## 2022-12-02 PROCEDURE — 96361 HYDRATE IV INFUSION ADD-ON: CPT

## 2022-12-02 PROCEDURE — 96375 TX/PRO/DX INJ NEW DRUG ADDON: CPT

## 2022-12-02 PROCEDURE — 99285 EMERGENCY DEPT VISIT HI MDM: CPT

## 2022-12-02 PROCEDURE — 87804 INFLUENZA ASSAY W/OPTIC: CPT

## 2022-12-02 PROCEDURE — 74011000636 HC RX REV CODE- 636: Performed by: STUDENT IN AN ORGANIZED HEALTH CARE EDUCATION/TRAINING PROGRAM

## 2022-12-02 PROCEDURE — 83880 ASSAY OF NATRIURETIC PEPTIDE: CPT

## 2022-12-02 PROCEDURE — 74011250636 HC RX REV CODE- 250/636: Performed by: STUDENT IN AN ORGANIZED HEALTH CARE EDUCATION/TRAINING PROGRAM

## 2022-12-02 PROCEDURE — 96374 THER/PROPH/DIAG INJ IV PUSH: CPT

## 2022-12-02 PROCEDURE — 80053 COMPREHEN METABOLIC PANEL: CPT

## 2022-12-02 RX ORDER — KETOROLAC TROMETHAMINE 30 MG/ML
15 INJECTION, SOLUTION INTRAMUSCULAR; INTRAVENOUS
Status: COMPLETED | OUTPATIENT
Start: 2022-12-02 | End: 2022-12-02

## 2022-12-02 RX ORDER — POTASSIUM CHLORIDE 7.45 MG/ML
10 INJECTION INTRAVENOUS
Status: COMPLETED | OUTPATIENT
Start: 2022-12-02 | End: 2022-12-02

## 2022-12-02 RX ORDER — BUTALBITAL, ACETAMINOPHEN AND CAFFEINE 300; 40; 50 MG/1; MG/1; MG/1
1 CAPSULE ORAL
Qty: 12 CAPSULE | Refills: 0 | Status: SHIPPED | OUTPATIENT
Start: 2022-12-02

## 2022-12-02 RX ORDER — NAPROXEN 500 MG/1
500 TABLET ORAL 2 TIMES DAILY WITH MEALS
Qty: 20 TABLET | Refills: 0 | Status: SHIPPED | OUTPATIENT
Start: 2022-12-02 | End: 2022-12-12

## 2022-12-02 RX ORDER — METOCLOPRAMIDE HYDROCHLORIDE 5 MG/ML
10 INJECTION INTRAMUSCULAR; INTRAVENOUS
Status: COMPLETED | OUTPATIENT
Start: 2022-12-02 | End: 2022-12-02

## 2022-12-02 RX ORDER — BUTALBITAL, ACETAMINOPHEN AND CAFFEINE 50; 325; 40 MG/1; MG/1; MG/1
1 TABLET ORAL
Status: COMPLETED | OUTPATIENT
Start: 2022-12-02 | End: 2022-12-02

## 2022-12-02 RX ORDER — POTASSIUM CHLORIDE 750 MG/1
40 TABLET, FILM COATED, EXTENDED RELEASE ORAL
Status: COMPLETED | OUTPATIENT
Start: 2022-12-02 | End: 2022-12-02

## 2022-12-02 RX ORDER — MECLIZINE HCL 12.5 MG 12.5 MG/1
25 TABLET ORAL
Status: COMPLETED | OUTPATIENT
Start: 2022-12-02 | End: 2022-12-02

## 2022-12-02 RX ADMIN — SODIUM CHLORIDE 1000 ML: 9 INJECTION, SOLUTION INTRAVENOUS at 18:37

## 2022-12-02 RX ADMIN — POTASSIUM CHLORIDE 40 MEQ: 750 TABLET, FILM COATED, EXTENDED RELEASE ORAL at 19:02

## 2022-12-02 RX ADMIN — POTASSIUM CHLORIDE 10 MEQ: 7.46 INJECTION, SOLUTION INTRAVENOUS at 19:05

## 2022-12-02 RX ADMIN — IOPAMIDOL 100 ML: 755 INJECTION, SOLUTION INTRAVENOUS at 19:32

## 2022-12-02 RX ADMIN — MECLIZINE 25 MG: 12.5 TABLET ORAL at 19:02

## 2022-12-02 RX ADMIN — KETOROLAC TROMETHAMINE 15 MG: 30 INJECTION, SOLUTION INTRAMUSCULAR; INTRAVENOUS at 20:19

## 2022-12-02 RX ADMIN — METOCLOPRAMIDE 10 MG: 5 INJECTION, SOLUTION INTRAMUSCULAR; INTRAVENOUS at 19:03

## 2022-12-02 RX ADMIN — BUTALBITAL, ACETAMINOPHEN, AND CAFFEINE 1 TABLET: 50; 325; 40 TABLET ORAL at 19:02

## 2022-12-03 LAB
ATRIAL RATE: 51 BPM
ATRIAL RATE: 67 BPM
CALCULATED P AXIS, ECG09: 39 DEGREES
CALCULATED P AXIS, ECG09: 53 DEGREES
CALCULATED R AXIS, ECG10: 19 DEGREES
CALCULATED R AXIS, ECG10: 45 DEGREES
CALCULATED T AXIS, ECG11: 166 DEGREES
CALCULATED T AXIS, ECG11: 171 DEGREES
DIAGNOSIS, 93000: NORMAL
DIAGNOSIS, 93000: NORMAL
P-R INTERVAL, ECG05: 200 MS
P-R INTERVAL, ECG05: 204 MS
Q-T INTERVAL, ECG07: 466 MS
Q-T INTERVAL, ECG07: 500 MS
QRS DURATION, ECG06: 110 MS
QRS DURATION, ECG06: 116 MS
QTC CALCULATION (BEZET), ECG08: 460 MS
QTC CALCULATION (BEZET), ECG08: 492 MS
VENTRICULAR RATE, ECG03: 51 BPM
VENTRICULAR RATE, ECG03: 67 BPM

## 2022-12-03 NOTE — ED NOTES
Transportation here to escort patient CT at this time, patient is alert and oriented with no apparent distress.

## 2022-12-03 NOTE — ED NOTES
I have reviewed discharge instructions with the patient. The patient verbalized understanding. Answered all questions at this time.

## 2022-12-03 NOTE — ED PROVIDER NOTES
EMERGENCY DEPARTMENT HISTORY AND PHYSICAL EXAM      Date: 12/2/2022  Patient Name: Golden Harrington    History of Presenting Illness     Chief Complaint   Patient presents with    Headache     Bad headache all week ; no head injury. Feels like he is going to pass out. Pt has had no vomiting no diarrhea. He thought his bp was elevated. He has no chest pain but feels faint in triage. HPI: Golden Harrington, 52 y.o. male presents to the ED with cc of headache. This has been going on for the past 5 days, he describes it as a crushing pain in the occipital region. It was not worst at onset. Radiates down the side to both of his shoulders. He reports associated photophobia and phonophobia, and lightheadedness and dizziness. He denies any weakness numbness or tingling in the arms or legs, no difficulty speaking or swallowing, no vision changes. He denies any fevers, abdominal pain, vomiting or diarrhea. There are no other complaints, changes, or physical findings at this time. PCP: Rio Ann MD    No current facility-administered medications on file prior to encounter. Current Outpatient Medications on File Prior to Encounter   Medication Sig Dispense Refill    diclofenac EC (VOLTAREN) 75 mg EC tablet Take 1 Tablet by mouth two (2) times a day. 20 Tablet 0    amoxicillin-clavulanate (AUGMENTIN) 875-125 mg per tablet Take 1 Tab by mouth two (2) times a day. 20 Tab 0    butalbital-acetaminophen-caff (FIORICET) -40 mg per capsule Take 1 Cap by mouth every four (4) hours as needed for Pain or Headache. 12 Cap 0    cloNIDine (CATAPRES) 0.2 mg tablet Take 1 Tab by mouth two (2) times a day. 180 Tab 0    carvedilol (COREG) 25 mg tablet Take 1 Tab by mouth two (2) times daily (with meals).  180 Tab 0       Past History     Past Medical History:  Past Medical History:   Diagnosis Date    Hypertension     Septicemia ([address]        Past Surgical History:  Past Surgical History:   Procedure Laterality Date    HX ORTHOPAEDIC      R leg fracture, L 5th finger fracture    ID LEG/ANKLE SURGERY PROC UNLISTED         Family History:  Family History   Problem Relation Age of Onset    Diabetes Mother     Hypertension Mother     Diabetes Father     Hypertension Father     Cancer Neg Hx        Social History:  Social History     Tobacco Use    Smoking status: Never    Smokeless tobacco: Former   Substance Use Topics    Alcohol use: No    Drug use: Yes     Types: Marijuana       Allergies: Allergies   Allergen Reactions    Lisinopril Other (comments) and Angioedema     Lost appetite/lip swelling      Morphine Nausea and Vomiting    Viagra [Sildenafil] Diarrhea         Review of Systems   no fever  Reports head pain  no shortness of breath  no chest pain  no abdominal pain  no dysuria  no leg pain  No rash  No lymphadenopathy  No weight loss    Physical Exam   Physical Exam  Constitutional:       General: He is not in acute distress. Appearance: He is not toxic-appearing. HENT:      Head: Normocephalic and atraumatic. Eyes:      Extraocular Movements: Extraocular movements intact. Pupils: Pupils are equal, round, and reactive to light. Neck:      Comments: Patient can move neck freely and put chin to chest without difficulty  Cardiovascular:      Rate and Rhythm: Normal rate and regular rhythm. Pulmonary:      Effort: Pulmonary effort is normal.      Breath sounds: Normal breath sounds. Abdominal:      Palpations: Abdomen is soft. Tenderness: There is no abdominal tenderness. Musculoskeletal:         General: No deformity. Cervical back: Neck supple. Comments:  tender over bilateral trapezius muscles   Skin:     General: Skin is warm and dry. Neurological:      General: No focal deficit present. Mental Status: He is alert and oriented to person, place, and time. Cranial Nerves: No cranial nerve deficit.       Comments: 5/5 strength with bicep flexion and extension bilaterally, 5/5 strength with ankle flexion and extension bilaterally. Sensation to light touch intact over upper and lower extremities bilaterally. Psychiatric:         Mood and Affect: Mood normal.       Diagnostic Study Results     Labs -     Recent Results (from the past 24 hour(s))   EKG, 12 LEAD, INITIAL    Collection Time: 12/02/22  5:47 PM   Result Value Ref Range    Ventricular Rate 51 BPM    Atrial Rate 51 BPM    P-R Interval 204 ms    QRS Duration 110 ms    Q-T Interval 500 ms    QTC Calculation (Bezet) 460 ms    Calculated P Axis 39 degrees    Calculated R Axis 19 degrees    Calculated T Axis 171 degrees    Diagnosis       Sinus bradycardia  Left ventricular hypertrophy with repolarization abnormality  When compared with ECG of 07-APR-2012 07:56,  ST now depressed in Inferior leads  ST now depressed in Lateral leads  T wave inversion now evident in Inferior leads  T wave inversion more evident in Lateral leads     CBC WITH AUTOMATED DIFF    Collection Time: 12/02/22  6:06 PM   Result Value Ref Range    WBC 5.4 4.1 - 11.1 K/uL    RBC 4.29 4. 10 - 5.70 M/uL    HGB 12.4 12.1 - 17.0 g/dL    HCT 36.7 36.6 - 50.3 %    MCV 85.5 80.0 - 99.0 FL    MCH 28.9 26.0 - 34.0 PG    MCHC 33.8 30.0 - 36.5 g/dL    RDW 13.3 11.5 - 14.5 %    PLATELET 197 604 - 472 K/uL    MPV 10.4 8.9 - 12.9 FL    NRBC 0.0 0  WBC    ABSOLUTE NRBC 0.00 0.00 - 0.01 K/uL    NEUTROPHILS 40 32 - 75 %    LYMPHOCYTES 44 12 - 49 %    MONOCYTES 11 5 - 13 %    EOSINOPHILS 5 0 - 7 %    BASOPHILS 0 0 - 1 %    IMMATURE GRANULOCYTES 0 0.0 - 0.5 %    ABS. NEUTROPHILS 2.2 1.8 - 8.0 K/UL    ABS. LYMPHOCYTES 2.4 0.8 - 3.5 K/UL    ABS. MONOCYTES 0.6 0.0 - 1.0 K/UL    ABS. EOSINOPHILS 0.2 0.0 - 0.4 K/UL    ABS. BASOPHILS 0.0 0.0 - 0.1 K/UL    ABS. IMM.  GRANS. 0.0 0.00 - 0.04 K/UL    DF AUTOMATED     METABOLIC PANEL, COMPREHENSIVE    Collection Time: 12/02/22  6:06 PM   Result Value Ref Range    Sodium 139 136 - 145 mmol/L    Potassium 2.7 (LL) 3.5 - 5.1 mmol/L Chloride 102 97 - 108 mmol/L    CO2 33 (H) 21 - 32 mmol/L    Anion gap 4 (L) 5 - 15 mmol/L    Glucose 119 (H) 65 - 100 mg/dL    BUN 17 6 - 20 MG/DL    Creatinine 1.51 (H) 0.70 - 1.30 MG/DL    BUN/Creatinine ratio 11 (L) 12 - 20      eGFR 57 (L) >60 ml/min/1.73m2    Calcium 8.7 8.5 - 10.1 MG/DL    Bilirubin, total 1.1 (H) 0.2 - 1.0 MG/DL    ALT (SGPT) 27 12 - 78 U/L    AST (SGOT) 18 15 - 37 U/L    Alk. phosphatase 54 45 - 117 U/L    Protein, total 7.4 6.4 - 8.2 g/dL    Albumin 3.6 3.5 - 5.0 g/dL    Globulin 3.8 2.0 - 4.0 g/dL    A-G Ratio 0.9 (L) 1.1 - 2.2     NT-PRO BNP    Collection Time: 12/02/22  6:06 PM   Result Value Ref Range    NT pro- <125 PG/ML   TROPONIN-HIGH SENSITIVITY    Collection Time: 12/02/22  6:06 PM   Result Value Ref Range    Troponin-High Sensitivity 48 0 - 76 ng/L   MAGNESIUM    Collection Time: 12/02/22  6:06 PM   Result Value Ref Range    Magnesium 2.1 1.6 - 2.4 mg/dL   INFLUENZA A+B VIRAL AGS    Collection Time: 12/02/22  7:12 PM   Result Value Ref Range    Influenza A Antigen Negative NEG      Influenza B Antigen Negative NEG     TROPONIN-HIGH SENSITIVITY    Collection Time: 12/02/22  8:16 PM   Result Value Ref Range    Troponin-High Sensitivity 48 0 - 76 ng/L       Radiologic Studies -   CTA HEAD NECK W CONT   Final Result   There is no evidence of aneurysm, dissection or hemodynamically significant   stenosis. .      There is no intracranial mass, hemorrhage or evidence of acute infarction. No acute intracranial process is identified. .       CT HEAD WO CONT   Final Result   No acute abnormality. XR CHEST PORT   Final Result      Pulmonary vascular congestion with moderate edema pattern. Possible small   bilateral pleural effusions. CT Results  (Last 48 hours)                 12/02/22 1930  CTA HEAD NECK W CONT Final result    Impression:  There is no evidence of aneurysm, dissection or hemodynamically significant   stenosis. .       There is no intracranial mass, hemorrhage or evidence of acute infarction. No acute intracranial process is identified. .        Narrative:  CLINICAL HISTORY: occipital headache, dizziness   INDICATION: occipital headache, dizziness       COMPARISON: December 2, 2022. CONTRAST: 100 ml [id number]       TECHNIQUE:  Unenhanced  images were obtained to localize the volume for   acquisition. Multislice helical axial CT angiography was performed from the   aortic arch to the top of the head during uneventful rapid bolus intravenous   contrast administration. Coronal and sagittal reformations and 3D post   processing was performed. CT dose reduction was achieved through use of a   standardized protocol tailored for this examination and automatic exposure   control for dose modulation. Examination was evaluated utilizing the viz. AI algorithm. FINDINGS:   CTA NECK   There is conventional three vessel arch anatomy. The bilateral subclavian,   common carotid, and internal carotid arteries are patent with no flow-limiting   stenosis. % of right carotid artery stenosis: 0   % of left carotid artery stenosis: 0   Measurements utilizing NASCET criteria. NASCET method was utilized for calculating stenosis. ACDF at C5-6. Azygos fissure. Dominant right vertebral artery. Minimal   atherosclerotic changes the origin of the ICAs. The cervical soft tissues are   unremarkable. There are degenerative changes of the cervical spine. CTA HEAD   The dural venous sinuses are grossly patent. . Petrous and cavernous carotid   arteries are mildly atherosclerotic, patent. .A 2 segments are patent. Symmetric   arborization of M2 vessels is demonstrated. The basilar artery is patent. . The   M1 segments are patent bilaterally. .The posterior cerebral arteries on the right   and on the left are patent. .  There is a hypoplastic A1 segment on the left. Azygous origin of the A3 segments. . There are no sizable posterior communicating   arteries. No evidence of acute intracranial hemorrhage or midline shift is demonstrated. 12/02/22 1828  CT HEAD WO CONT Final result    Impression:  No acute abnormality. Narrative:  EXAM: CT HEAD WO CONT       INDICATION: headache       COMPARISON: None. CONTRAST: None. TECHNIQUE: Unenhanced CT of the head was performed using 5 mm images. Brain and   bone windows were generated. Coronal and sagittal reformats. CT dose reduction   was achieved through use of a standardized protocol tailored for this   examination and automatic exposure control for dose modulation. FINDINGS:   The ventricles and sulci are normal in size, shape and configuration. . There is   no significant white matter disease. There is no intracranial hemorrhage,   extra-axial collection, or mass effect. The basilar cisterns are open. No CT   evidence of acute infarct. The bone windows demonstrate no abnormalities. Bilateral maxillary sinus mucous   retention cysts. Mastoid air cells are clear. CXR Results  (Last 48 hours)                 12/02/22 1823  XR CHEST PORT Final result    Impression:      Pulmonary vascular congestion with moderate edema pattern. Possible small   bilateral pleural effusions. Narrative:  EXAM:  XR CHEST PORT       INDICATION: chest pain       COMPARISON: Chest radiograph 4/7/2012       TECHNIQUE: Upright portable chest AP view       FINDINGS:        Cardiomediastinal silhouette is stably enlarged. Pulmonary vascular congestion   with moderate edema pattern. Possible small bilateral pleural effusions. No   definite pneumothoraces. Medical Decision Making   I am the first provider for this patient. I reviewed the vital signs, available nursing notes, past medical history, past surgical history, family history and social history. Vital Signs-Reviewed the patient's vital signs.   Patient Vitals for the past 24 hrs:   Temp Pulse Resp BP SpO2   12/02/22 2045 -- 69 21 (!) 173/88 96 %   12/02/22 2020 -- 73 22 (!) 186/77 95 %   12/02/22 1836 98.5 °F (36.9 °C) (!) 53 16 (!) 187/77 98 %   12/02/22 1743 98.2 °F (36.8 °C) (!) 57 16 (!) 145/76 100 %         Provider Notes (Medical Decision Making):   22-year-old male presenting with headache. Differential includes migraine headache, tension headache, less likely cluster headache, myalgia. He reports some associated dizziness, his neurologic exam is otherwise unremarkable so low suspicion for stroke, however given associated symptom will obtain CTA to assess for any acute worrisome vascular abnormality. He is afebrile and nontoxic-appearing, neck supple, unlikely CNS infection. Neurologic exam is unremarkable, no thunderclap onset, well-appearing, unlikely acute intracranial space-occupying lesion or bleed. He denies any chest pain, however will obtain EKG to assess for any arrhythmia. Otherwise he is saturating well on room air, lungs clear, unlikely any other acute cardiopulmonary emergency. ED Course:     Initial assessment performed. The patients presenting problems have been discussed, and they are in agreement with the care plan formulated and outlined with them. I have encouraged them to ask questions as they arise throughout their visit. He is given IV fluids, Fioricet p.o., Reglan IV. EKG is performed at 17: 47, shows sinus bradycardia at a rate of 51, , , QTc 460, axis upright, no ST segment elevation or depression concerning for ACS, LVH is present, 2 inversions in lead I, II, aVL and V5 and V6. Patient continues to deny chest pain as above, EKG will be repeated given abnormalities.     CBC negative for leukocytosis or anemia, basic metabolic panel with potassium low 2.7, creatinine elevated 1.51 otherwise no worrisome electrolyte abnormalities, influenza is negative, troponin is not significantly elevated at 48, CTA of the head shows no evidence of aneurysm, dissection or hemodynamically significant stenosis, no mass, hemorrhage or acute infarction. Repeat EKG is performed at 20: 10, shows normal sinus rhythm at a rate of 67, , , QTc 492, axis upright, continued T wave inversions in lead I, II and V5 and V6, not significantly changed from prior, LVH present, this is interpreted as sinus rhythm with stable T wave inversions and LVH. Repeat troponin unchanged at 48. On reevaluation, patient is resting comfortably and states that they feel improved. Patient is counseled on supportive care and return precautions. Will return to the ED for any worsening pain, weakness or numbness, or any new or worrisome symptoms. Will followup with primary care doctor within 3 days. Critical Care Time:         Disposition:  Home    PLAN:  1. Current Discharge Medication List        START taking these medications    Details   naproxen (Naprosyn) 500 mg tablet Take 1 Tablet by mouth two (2) times daily (with meals) for 10 days. Qty: 20 Tablet, Refills: 0  Start date: 12/2/2022, End date: 12/12/2022      !! butalbital-acetaminophen-caff (FIORICET) -40 mg per capsule Take 1 Capsule by mouth every four (4) hours as needed for Headache. Qty: 12 Capsule, Refills: 0  Start date: 12/2/2022       !! - Potential duplicate medications found. Please discuss with provider. CONTINUE these medications which have NOT CHANGED    Details   !! butalbital-acetaminophen-caff (FIORICET) -40 mg per capsule Take 1 Cap by mouth every four (4) hours as needed for Pain or Headache. Qty: 12 Cap, Refills: 0       !! - Potential duplicate medications found. Please discuss with provider.         2.   Follow-up Information       Follow up With Specialties Details Why Armando Suarez MD Internal Medicine Physician, Internal Medicine Physician Schedule an appointment as soon as possible for a visit   [address] [place] [id number] [phone]      [facility] EMERGENCY DEPT Emergency Medicine  As needed, If symptoms worsen [address]  [address] [facility]  [phone]          Return to ED if worse     Diagnosis     Clinical Impression: Acute headache, acute hypokalemia, elevated blood pressure with history of hypertension

## 2025-03-11 ENCOUNTER — TRANSCRIBE ORDERS (OUTPATIENT)
Facility: HOSPITAL | Age: 50
End: 2025-03-11

## 2025-03-11 DIAGNOSIS — N17.9 ACUTE RENAL FAILURE, UNSPECIFIED ACUTE RENAL FAILURE TYPE: Primary | ICD-10-CM

## 2025-03-20 ENCOUNTER — HOSPITAL ENCOUNTER (OUTPATIENT)
Facility: HOSPITAL | Age: 50
Discharge: HOME | End: 2025-03-23
Attending: INTERNAL MEDICINE
Payer: COMMERCIAL

## 2025-03-20 DIAGNOSIS — N17.9 ACUTE RENAL FAILURE, UNSPECIFIED ACUTE RENAL FAILURE TYPE: ICD-10-CM

## 2025-03-20 PROCEDURE — 76770 US EXAM ABDO BACK WALL COMP: CPT

## 2025-03-20 NOTE — PROGRESS NOTES
[facility], [facility].       March 20, 2025       RE: Brian King      To Whom It May Concern,    This is to certify that Brian King was seen here on this date.    Thank you for your assistance in this matter.      Sincerely,  Anh Muhammad